# Patient Record
Sex: FEMALE | Race: ASIAN | URBAN - METROPOLITAN AREA
[De-identification: names, ages, dates, MRNs, and addresses within clinical notes are randomized per-mention and may not be internally consistent; named-entity substitution may affect disease eponyms.]

---

## 2018-06-28 ENCOUNTER — OP HISTORICAL/CONVERTED ENCOUNTER (OUTPATIENT)
Dept: OTHER | Age: 73
End: 2018-06-28

## 2023-03-22 ENCOUNTER — HOSPITAL ENCOUNTER (EMERGENCY)
Age: 78
Discharge: HOME | End: 2023-03-22
Attending: EMERGENCY MEDICINE
Payer: MEDICARE

## 2023-03-22 ENCOUNTER — APPOINTMENT (OUTPATIENT)
Dept: GENERAL RADIOLOGY | Age: 78
End: 2023-03-22
Attending: EMERGENCY MEDICINE
Payer: MEDICARE

## 2023-03-22 VITALS
DIASTOLIC BLOOD PRESSURE: 64 MMHG | HEIGHT: 60 IN | TEMPERATURE: 98 F | BODY MASS INDEX: 29.45 KG/M2 | RESPIRATION RATE: 18 BRPM | HEART RATE: 66 BPM | WEIGHT: 150 LBS | OXYGEN SATURATION: 100 % | SYSTOLIC BLOOD PRESSURE: 149 MMHG

## 2023-03-22 DIAGNOSIS — K29.90 GASTRITIS AND DUODENITIS: Primary | ICD-10-CM

## 2023-03-22 LAB
ALBUMIN SERPL-MCNC: 3.9 G/DL (ref 3.5–5)
ALBUMIN/GLOB SERPL: 1.3 (ref 1.1–2.2)
ALP SERPL-CCNC: 78 U/L (ref 45–117)
ALT SERPL-CCNC: 38 U/L (ref 12–78)
ANION GAP SERPL CALC-SCNC: 4 MMOL/L (ref 5–15)
AST SERPL W P-5'-P-CCNC: 20 U/L (ref 15–37)
BASOPHILS # BLD: 0 K/UL (ref 0–0.1)
BASOPHILS NFR BLD: 1 % (ref 0–1)
BILIRUB SERPL-MCNC: 0.4 MG/DL (ref 0.2–1)
BNP SERPL-MCNC: 109 PG/ML
BUN SERPL-MCNC: 15 MG/DL (ref 6–20)
BUN/CREAT SERPL: 13 (ref 12–20)
CA-I BLD-MCNC: 9 MG/DL (ref 8.5–10.1)
CHLORIDE SERPL-SCNC: 111 MMOL/L (ref 97–108)
CO2 SERPL-SCNC: 23 MMOL/L (ref 21–32)
CREAT SERPL-MCNC: 1.13 MG/DL (ref 0.55–1.02)
DIFFERENTIAL METHOD BLD: ABNORMAL
EOSINOPHIL # BLD: 0.1 K/UL (ref 0–0.4)
EOSINOPHIL NFR BLD: 1 % (ref 0–7)
ERYTHROCYTE [DISTWIDTH] IN BLOOD BY AUTOMATED COUNT: 12.4 % (ref 11.5–14.5)
GLOBULIN SER CALC-MCNC: 3.1 G/DL (ref 2–4)
GLUCOSE SERPL-MCNC: 169 MG/DL (ref 65–100)
HCT VFR BLD AUTO: 37.2 % (ref 35–47)
HGB BLD-MCNC: 11.9 G/DL (ref 11.5–16)
IMM GRANULOCYTES # BLD AUTO: 0.1 K/UL (ref 0–0.04)
IMM GRANULOCYTES NFR BLD AUTO: 1 % (ref 0–0.5)
LIPASE SERPL-CCNC: 197 U/L (ref 73–393)
LYMPHOCYTES # BLD: 1.3 K/UL (ref 0.8–3.5)
LYMPHOCYTES NFR BLD: 17 % (ref 12–49)
MCH RBC QN AUTO: 29.6 PG (ref 26–34)
MCHC RBC AUTO-ENTMCNC: 32 G/DL (ref 30–36.5)
MCV RBC AUTO: 92.5 FL (ref 80–99)
MONOCYTES # BLD: 0.3 K/UL (ref 0–1)
MONOCYTES NFR BLD: 4 % (ref 5–13)
NEUTS SEG # BLD: 5.6 K/UL (ref 1.8–8)
NEUTS SEG NFR BLD: 76 % (ref 32–75)
NRBC # BLD: 0 K/UL (ref 0–0.01)
NRBC BLD-RTO: 0 PER 100 WBC
PLATELET # BLD AUTO: 237 K/UL (ref 150–400)
PMV BLD AUTO: 9.2 FL (ref 8.9–12.9)
POTASSIUM SERPL-SCNC: 4.3 MMOL/L (ref 3.5–5.1)
PROT SERPL-MCNC: 7 G/DL (ref 6.4–8.2)
RBC # BLD AUTO: 4.02 M/UL (ref 3.8–5.2)
SODIUM SERPL-SCNC: 138 MMOL/L (ref 136–145)
TROPONIN I SERPL HS-MCNC: 5 NG/L (ref 0–51)
WBC # BLD AUTO: 7.3 K/UL (ref 3.6–11)

## 2023-03-22 PROCEDURE — 80053 COMPREHEN METABOLIC PANEL: CPT

## 2023-03-22 PROCEDURE — 93005 ELECTROCARDIOGRAM TRACING: CPT

## 2023-03-22 PROCEDURE — 84484 ASSAY OF TROPONIN QUANT: CPT

## 2023-03-22 PROCEDURE — 83690 ASSAY OF LIPASE: CPT

## 2023-03-22 PROCEDURE — 74011250636 HC RX REV CODE- 250/636: Performed by: EMERGENCY MEDICINE

## 2023-03-22 PROCEDURE — 96374 THER/PROPH/DIAG INJ IV PUSH: CPT

## 2023-03-22 PROCEDURE — 74011000250 HC RX REV CODE- 250: Performed by: EMERGENCY MEDICINE

## 2023-03-22 PROCEDURE — 85025 COMPLETE CBC W/AUTO DIFF WBC: CPT

## 2023-03-22 PROCEDURE — 99285 EMERGENCY DEPT VISIT HI MDM: CPT

## 2023-03-22 PROCEDURE — 36415 COLL VENOUS BLD VENIPUNCTURE: CPT

## 2023-03-22 PROCEDURE — 71045 X-RAY EXAM CHEST 1 VIEW: CPT

## 2023-03-22 PROCEDURE — 74011250637 HC RX REV CODE- 250/637: Performed by: EMERGENCY MEDICINE

## 2023-03-22 PROCEDURE — 83880 ASSAY OF NATRIURETIC PEPTIDE: CPT

## 2023-03-22 RX ORDER — LIDOCAINE HYDROCHLORIDE 20 MG/ML
15 SOLUTION OROPHARYNGEAL
Status: COMPLETED | OUTPATIENT
Start: 2023-03-22 | End: 2023-03-22

## 2023-03-22 RX ORDER — ONDANSETRON 4 MG/1
4 TABLET, ORALLY DISINTEGRATING ORAL
Qty: 20 TABLET | Refills: 0 | Status: SHIPPED | OUTPATIENT
Start: 2023-03-22

## 2023-03-22 RX ORDER — DICYCLOMINE HYDROCHLORIDE 10 MG/5ML
20 SOLUTION ORAL 4 TIMES DAILY
Qty: 473 ML | Refills: 0 | Status: SHIPPED | OUTPATIENT
Start: 2023-03-22

## 2023-03-22 RX ORDER — MAG HYDROX/ALUMINUM HYD/SIMETH 200-200-20
30 SUSPENSION, ORAL (FINAL DOSE FORM) ORAL ONCE
Status: COMPLETED | OUTPATIENT
Start: 2023-03-22 | End: 2023-03-22

## 2023-03-22 RX ORDER — ACETAMINOPHEN 325 MG/1
650 TABLET ORAL
Status: COMPLETED | OUTPATIENT
Start: 2023-03-22 | End: 2023-03-22

## 2023-03-22 RX ORDER — ONDANSETRON 2 MG/ML
4 INJECTION INTRAMUSCULAR; INTRAVENOUS
Status: COMPLETED | OUTPATIENT
Start: 2023-03-22 | End: 2023-03-22

## 2023-03-22 RX ORDER — FAMOTIDINE 20 MG/1
20 TABLET, FILM COATED ORAL 2 TIMES DAILY
Qty: 20 TABLET | Refills: 0 | Status: SHIPPED | OUTPATIENT
Start: 2023-03-22 | End: 2023-04-01

## 2023-03-22 RX ADMIN — LIDOCAINE HYDROCHLORIDE 15 ML: 20 SOLUTION ORAL; TOPICAL at 13:43

## 2023-03-22 RX ADMIN — ALUMINUM HYDROXIDE, MAGNESIUM HYDROXIDE, AND SIMETHICONE 30 ML: 200; 200; 20 SUSPENSION ORAL at 13:40

## 2023-03-22 RX ADMIN — ONDANSETRON 4 MG: 2 INJECTION INTRAMUSCULAR; INTRAVENOUS at 12:29

## 2023-03-22 RX ADMIN — ACETAMINOPHEN 650 MG: 325 TABLET ORAL at 14:12

## 2023-03-22 NOTE — ED NOTES
Pt presented to the ER with complaint of Epigastric pain. Pt stated she started having epigastric pain and headache this morning. Pt complains of N/V with onset. oriented to room and call bell,, cardiac monitoring initiated , spO2 Monitoring initiated , IV  initiated , call bell within reach, side rails up x2, resting comfortable but easily arousable, no signs of acute distress. , bed in lowest position, will continue to monitor      Airway: Patent, Managing oral secretions, and No obstruction    Respiratory: Normal Rate , Normal Depth, No acute Distress, and Speaking in full sentences    Cardiac: WNL    Neuro: AVPU alert and A&O4/4    GI: Abdominal pain to Epigastric pain, Soft, and Tender    : WNL    Muscle/Skeletal: WNL

## 2023-03-22 NOTE — ED PROVIDER NOTES
EMERGENCY DEPARTMENT HISTORY AND PHYSICAL EXAM      Date: 3/22/2023  Patient Name: Faisal Jacobs    History of Presenting Illness     Chief Complaint   Patient presents with    Abdominal Pain       History Provided By: Patient    HPI: Faisal Jacobs, 70 y.o. female with a past medical history significant No significant past medical history presents to the ED with chief complaint of Abdominal Pain  . 12year-old female presents with epigastric abdominal pain. Symptoms have been present for a few days. No nausea or vomiting. No black or bloody stool. No diarrhea or constipation. No radiation of the discomfort. No chest pain. There are no other complaints, changes, or physical findings at this time. PCP: Renu Torres MD    Current Outpatient Medications   Medication Sig Dispense Refill    ondansetron (ZOFRAN ODT) 4 mg disintegrating tablet Take 1 Tablet by mouth every eight (8) hours as needed for Nausea or Vomiting. 20 Tablet 0    famotidine (Pepcid) 20 mg tablet Take 1 Tablet by mouth two (2) times a day for 10 days. 20 Tablet 0    dicyclomine (BENTYL) 10 mg/5 mL soln oral solution Take 10 mL by mouth four (4) times daily. 473 mL 0       Past History     Past Medical History:  No past medical history on file. Past Surgical History:  No past surgical history on file. Family History:  No family history on file. Social History: Allergies: Allergies   Allergen Reactions    Pcn [Penicillins] Unknown (comments)         Review of Systems   Review of Systems   Constitutional: Negative. Negative for chills, fatigue and fever. HENT: Negative. Negative for congestion, nosebleeds and sore throat. Eyes: Negative. Negative for pain, discharge and visual disturbance. Respiratory: Negative. Negative for cough, chest tightness and shortness of breath. Cardiovascular:  Negative for chest pain, palpitations and leg swelling.    Gastrointestinal:  Positive for abdominal pain. Negative for blood in stool, constipation, diarrhea, nausea and vomiting. Endocrine: Negative. Genitourinary: Negative. Negative for difficulty urinating, dysuria, pelvic pain and vaginal bleeding. Musculoskeletal: Negative. Negative for arthralgias, back pain and myalgias. Skin: Negative. Negative for rash and wound. Allergic/Immunologic: Negative. Neurological: Negative. Negative for dizziness, syncope, weakness, numbness and headaches. Hematological: Negative. Psychiatric/Behavioral: Negative. Negative for agitation, confusion and suicidal ideas. All other systems reviewed and are negative. Positives and Pertinent negatives as per HPI. Physical Exam   Patient Vitals for the past 24 hrs:   Temp Pulse Resp BP SpO2   03/22/23 1205 98 °F (36.7 °C) 66 18 (!) 149/64 100 %         Physical Exam  Vitals and nursing note reviewed. Exam conducted with a chaperone present. Constitutional:       Appearance: Normal appearance. She is normal weight. HENT:      Head: Normocephalic and atraumatic. Nose: Nose normal.      Mouth/Throat:      Mouth: Mucous membranes are moist.      Pharynx: Oropharynx is clear. Eyes:      Extraocular Movements: Extraocular movements intact. Conjunctiva/sclera: Conjunctivae normal.      Pupils: Pupils are equal, round, and reactive to light. Cardiovascular:      Rate and Rhythm: Normal rate and regular rhythm. Pulses: Normal pulses. Heart sounds: Normal heart sounds. Pulmonary:      Effort: Pulmonary effort is normal. No respiratory distress. Breath sounds: Normal breath sounds. Abdominal:      General: Abdomen is flat. Bowel sounds are normal. There is no distension. Palpations: Abdomen is soft. Tenderness: There is abdominal tenderness in the epigastric area. There is no guarding. Musculoskeletal:         General: No swelling, tenderness, deformity or signs of injury. Normal range of motion.       Cervical back: Normal range of motion and neck supple. Right lower leg: No edema. Left lower leg: No edema. Skin:     General: Skin is warm and dry. Capillary Refill: Capillary refill takes less than 2 seconds. Findings: No lesion or rash. Neurological:      General: No focal deficit present. Mental Status: She is alert and oriented to person, place, and time. Mental status is at baseline. Cranial Nerves: No cranial nerve deficit. Psychiatric:         Mood and Affect: Mood normal.         Behavior: Behavior normal.         Thought Content: Thought content normal.         Judgment: Judgment normal.       Diagnostic Study Results     LABS:   Recent Results (from the past 12 hour(s))   METABOLIC PANEL, COMPREHENSIVE    Collection Time: 03/22/23 12:20 PM   Result Value Ref Range    Sodium 138 136 - 145 mmol/L    Potassium 4.3 3.5 - 5.1 mmol/L    Chloride 111 (H) 97 - 108 mmol/L    CO2 23 21 - 32 mmol/L    Anion gap 4 (L) 5 - 15 mmol/L    Glucose 169 (H) 65 - 100 mg/dL    BUN 15 6 - 20 mg/dL    Creatinine 1.13 (H) 0.55 - 1.02 mg/dL    BUN/Creatinine ratio 13 12 - 20      eGFR 50 (L) >60 ml/min/1.73m2    Calcium 9.0 8.5 - 10.1 mg/dL    Bilirubin, total 0.4 0.2 - 1.0 mg/dL    AST (SGOT) 20 15 - 37 U/L    ALT (SGPT) 38 12 - 78 U/L    Alk.  phosphatase 78 45 - 117 U/L    Protein, total 7.0 6.4 - 8.2 g/dL    Albumin 3.9 3.5 - 5.0 g/dL    Globulin 3.1 2.0 - 4.0 g/dL    A-G Ratio 1.3 1.1 - 2.2     CBC WITH AUTOMATED DIFF    Collection Time: 03/22/23 12:20 PM   Result Value Ref Range    WBC 7.3 3.6 - 11.0 K/uL    RBC 4.02 3.80 - 5.20 M/uL    HGB 11.9 11.5 - 16.0 g/dL    HCT 37.2 35.0 - 47.0 %    MCV 92.5 80.0 - 99.0 FL    MCH 29.6 26.0 - 34.0 PG    MCHC 32.0 30.0 - 36.5 g/dL    RDW 12.4 11.5 - 14.5 %    PLATELET 811 085 - 426 K/uL    MPV 9.2 8.9 - 12.9 FL    NRBC 0.0 0.0  WBC    ABSOLUTE NRBC 0.00 0.00 - 0.01 K/uL    NEUTROPHILS 76 (H) 32 - 75 %    LYMPHOCYTES 17 12 - 49 %    MONOCYTES 4 (L) 5 - 13 %    EOSINOPHILS 1 0 - 7 %    BASOPHILS 1 0 - 1 %    IMMATURE GRANULOCYTES 1 (H) 0 - 0.5 %    ABS. NEUTROPHILS 5.6 1.8 - 8.0 K/UL    ABS. LYMPHOCYTES 1.3 0.8 - 3.5 K/UL    ABS. MONOCYTES 0.3 0.0 - 1.0 K/UL    ABS. EOSINOPHILS 0.1 0.0 - 0.4 K/UL    ABS. BASOPHILS 0.0 0.0 - 0.1 K/UL    ABS. IMM. GRANS. 0.1 (H) 0.00 - 0.04 K/UL    DF AUTOMATED     TROPONIN-HIGH SENSITIVITY    Collection Time: 03/22/23 12:20 PM   Result Value Ref Range    Troponin-High Sensitivity 5 0 - 51 ng/L   NT-PRO BNP    Collection Time: 03/22/23 12:20 PM   Result Value Ref Range    NT pro- <450 pg/mL   LIPASE    Collection Time: 03/22/23 12:20 PM   Result Value Ref Range    Lipase 197 73 - 393 U/L        EKG: EKG interpreted independently by ER physician. RADIOLOGY:  Non-plain film images such as CT, Ultrasound and MRI are read by the radiologist. Plain radiographic images are visualized and preliminarily interpreted by the ED Provider with the below findings:     Chest x-ray reviewed independently by ER physician. No evidence of pneumonia, pleural effusion, rib fracture or pneumothorax. No widened mediastinum. Negative acute. I do agree with radiology interpretation. Interpretation per the Radiologist below, if available at the time of this note:     XR CHEST PORT    Result Date: 3/22/2023  EXAM: XR CHEST PORT DATE: 3/22/2023 12:35 PM INDICATION: Epigastric pain COMPARISON: None. FINDINGS: AP portable chest radiograph. The heart size is normal. There are atheromatous calcifications of the aortic arch. No consolidation is seen. The vascular clarity is normal. There is no evidence of effusion or pneumothorax. No free air is seen beneath the hemidiaphragms     No acute cardiopulmonary findings. CT Results  (Last 48 hours)      None          CXR Results  (Last 48 hours)                 03/22/23 1235  XR CHEST PORT Final result    Impression:  No acute cardiopulmonary findings.                Narrative:  EXAM: XR CHEST PORT       DATE: 3/22/2023 12:35 PM       INDICATION: Epigastric pain       COMPARISON: None. FINDINGS: AP portable chest radiograph. The heart size is normal. There are   atheromatous calcifications of the aortic arch. No consolidation is seen. The   vascular clarity is normal. There is no evidence of effusion or pneumothorax. No   free air is seen beneath the hemidiaphragms                   Medical Decision Making and ED Course       CC/HPI Summary, DDx, ED Course, and Reassessment: 77-year-old female with epigastric pain without radiation differential includes pancreatitis versus gallstones versus upper GI bleed versus GERD gastritis. Also rule out ACS. Plan for lab work including CBC CMP lipase troponin x-ray EKG and reassessment. Trial of pain control with a GI cocktail.     These orders were placed during the ER evaluation:  Orders Placed This Encounter    XR CHEST PORT     Standing Status:   Standing     Number of Occurrences:   1     Order Specific Question:   Reason for Exam     Answer:   Epigastric pain    METABOLIC PANEL, COMPREHENSIVE     Standing Status:   Standing     Number of Occurrences:   1    CBC WITH AUTOMATED DIFF     Standing Status:   Standing     Number of Occurrences:   1    TROPONIN-HIGH SENSITIVITY     Standing Status:   Standing     Number of Occurrences:   1    BNP (NT-PRO)     Standing Status:   Standing     Number of Occurrences:   1    LIPASE     Standing Status:   Standing     Number of Occurrences:   1    URINALYSIS W/ RFLX MICROSCOPIC     Standing Status:   Standing     Number of Occurrences:   1    EKG 12 LEAD INITIAL     Standing Status:   Standing     Number of Occurrences:   1     Order Specific Question:   Reason for Exam:     Answer:   Epigastric pain    ondansetron (ZOFRAN) injection 4 mg    lidocaine (XYLOCAINE) 2 % viscous solution 15 mL    alum-mag hydroxide-simeth (MYLANTA) oral suspension 30 mL    ondansetron (ZOFRAN ODT) 4 mg disintegrating tablet     Sig: Take 1 Tablet by mouth every eight (8) hours as needed for Nausea or Vomiting. Dispense:  20 Tablet     Refill:  0    famotidine (Pepcid) 20 mg tablet     Sig: Take 1 Tablet by mouth two (2) times a day for 10 days. Dispense:  20 Tablet     Refill:  0    dicyclomine (BENTYL) 10 mg/5 mL soln oral solution     Sig: Take 10 mL by mouth four (4) times daily. Dispense:  473 mL     Refill:  0        Patient was given the following medications:  Medications   ondansetron (ZOFRAN) injection 4 mg (4 mg IntraVENous Given 3/22/23 1229)   lidocaine (XYLOCAINE) 2 % viscous solution 15 mL (15 mL Mouth/Throat Given 3/22/23 1343)   alum-mag hydroxide-simeth (MYLANTA) oral suspension 30 mL (30 mL Oral Given 3/22/23 1340)           ED Course:       ED Course as of 03/22/23 1411   Wed Mar 22, 2023   1253 EKG at 1221 normal sinus rhythm rate of 68. No ST changes. T wave inversions. Normal intervals. Is not ACS. Interpreted by ER physician. [HP]   5709 METABOLIC PANEL, COMPREHENSIVE(!):    Sodium 138   Potassium 4.3   Chloride 111(!)   CO2 23   Anion gap 4(!)   Glucose 169(!)   BUN 15   Creatinine 1.13(!)   BUN/Creatinine ratio 13   eGFR 50(!)   Calcium 9.0   Bilirubin, total 0.4   AST 20   ALT 38   Alk. phosphatase 78   Protein, total 7.0   Albumin 3.9   Globulin 3.1   A-G Ratio 1.3  Interpreted independently by ER physician as normal   [HP]   1308 CBC WITH AUTOMATED DIFF(!):    WBC 7.3   RBC 4.02   HGB 11.9   HCT 37.2   MCV 92.5   MCH 29.6   MCHC 32.0   RDW 12.4   PLATELET 113   MPV 9.2   NRBC 0.0   ABSOLUTE NRBC 0.00   NEUTROPHILS 76(!)   LYMPHOCYTES 17   MONOCYTES 4(!)   EOSINOPHILS 1   BASOPHILS 1   IMMATURE GRANULOCYTES 1(!)   ABS. NEUTROPHILS 5.6   ABS. LYMPHOCYTES 1.3   ABS. MONOCYTES 0.3   ABS. EOSINOPHILS 0.1   ABS. BASOPHILS 0.0   ABS. IMM.  GRANS. 0.1(!)   DF AUTOMATED  Interpreted independently by ER physician as normal   [HP]   3136 LIPASE:    Lipase 197  Interpreted independently by ER physician as normal   [HP]   1308 BNP (NT-PRO):    NT pro-  Interpreted independently by ER physician as normal   [HP]   1308 TROPONIN-HIGH SENSITIVITY:    Troponin-High Sensitivity 5  Interpreted independently by ER physician as normal   [HP]   4757 Patient is improved after a GI cocktail. [HP]      ED Course User Index  [HP] Felecia LA MD         Vital Signs-Reviewed the patient's vital signs. Patient Vitals for the past 24 hrs:   Temp Pulse Resp BP SpO2   03/22/23 1205 98 °F (36.7 °C) 66 18 (!) 149/64 100 %     Vitals interpreted independently by ER physician. stable    Medical decision making tools:       HEART SCORE:     History (slight suspicious 0, moderate +1, highly suspicious +2)  0  EKG (normal 0, nonspecific repol changes +1, ST changes +2)  0  Age (<45 y 0, 45-64y +1, >65y +2)  2  Risk Factors HTN, XOL, DM, obesity, smoker, CAD, PAD (none 0, 1-2 factors +1, >3 +2 factors) 0  Troponin (normal 0, 1-3x limit +1, >3x limit +2)  0    Heart Score 2        Management   Scores 0-3: 0.9-1.7% risk of adverse cardiac event. In the HEART Score study, these patients were discharged (0.99% in the retrospective study, 1.7% in the prospective study)   Scores 4-6: 12-16.6% risk of adverse cardiac event. In the HEART Score study, these patients were admitted to the hospital. (11.6% retrospective, 16.6% prospective)   Scores ? 7: 50-65% risk of adverse cardiac event. In the HEART Score study, these patients were candidates for early invasive measures. (65.2% retrospective, 50.1% prospective)   A MACE (Major Adverse Cardiac Event) was defined as all-cause mortality, myocardial infarction, or coronary revascularization. Original/Primary Reference  Slick COLEMAN, Amalia Escobar. Chest pain in the emergency room: value of the HEART score. Net Heart J. 2008;16(6):191-6. Validation  Angelica BLAIR, Slick COLEMAN, Amalia DOWNS, et al. A prospective validation of the HEART score for chest pain patients at the emergency department.  Int Rosette Deiters Cardiol. 2013;168(3):2153-8. Angelica BE, Slick AJ, Linus Kanr, et al. Chest pain in the emergency room: a multicenter validation of the HEART Score. Crit Pathw Cardiol. 2010;9(3):164-9. Haley KIANNA, Jareth RF, Lottie NANCE, et al. The HEART Pathway Randomized Controlled Trial One Year Outcomes. [facility] 2018; No data recorded          Disposition Considerations (Tests not done, Shared Decision Making, Pt Expectation of Test or Tx.): 72-year-old female low risk according to heart score with reproducible epigastric tenderness. Lab work is unremarkable including lipase LFTs leukocytosis not present. X-ray is also clear no evidence of perforated viscus. Patient is improved with a GI cocktail. Will treat for gastritis with a PPI Zofran and Bentyl for discharge. Also referral for GI. No evidence of GI bleed presently. CONSULTS: (Who and What was discussed)  None    Chronic Conditions: no    Social Determinants affecting Dx or Tx: None    Records Reviewed (source and summary of external notes): None  Records Reviewed: Previous Hospital chart. EMS run report. I reviewed the vital signs, available nursing notes, past medical history, past surgical history, family history and social history. Initial assessment performed. The patients presenting problems have been discussed, and they are in agreement with the care plan formulated and outlined with them. I have encouraged them to ask questions as they arise throughout their visit. Discharged      Procedures               Disposition       Emergency Department Disposition:  Discharged      Diagnosis     Clinical Impression:   1. Gastritis and duodenitis        Attestations:    Andrez Alvarez MD    Please note that this dictation was completed with [employer], the computer voice recognition software. Quite often unanticipated grammatical, syntax, homophones, and other interpretive errors are inadvertently transcribed by the computer software. Please disregard these errors. Please excuse any errors that have escaped final proofreading. Thank you.

## 2023-03-22 NOTE — Clinical Note
[facility] EMERGENCY DEPT  [address] [place] [id number]  [phone]    Work/School Note    Date: 3/22/2023    To Whom It May concern:      Kevyn Evans was seen and treated today in the emergency room by the following provider(s):  Attending Provider: Kayla Galvez MD.      Kevyn Evans is excused from work/school on 03/22/23. She is clear to return to work/school on 03/23/23.         Sincerely,          Prateek Valencia MD

## 2023-03-22 NOTE — DISCHARGE INSTRUCTIONS
Thank you! Thank you for allowing me to care for you in the emergency department. It is my goal to provide you with excellent care. If you have not received excellent quality care, please ask to speak to the nurse manager. Please fill out the survey that will come to you by mail or email since we listen to your feedback! Below you will find a list of your tests from today's visit. Should you have any questions, please do not hesitate to call the emergency department. Labs  Recent Results (from the past 12 hour(s))   METABOLIC PANEL, COMPREHENSIVE    Collection Time: 03/22/23 12:20 PM   Result Value Ref Range    Sodium 138 136 - 145 mmol/L    Potassium 4.3 3.5 - 5.1 mmol/L    Chloride 111 (H) 97 - 108 mmol/L    CO2 23 21 - 32 mmol/L    Anion gap 4 (L) 5 - 15 mmol/L    Glucose 169 (H) 65 - 100 mg/dL    BUN 15 6 - 20 mg/dL    Creatinine 1.13 (H) 0.55 - 1.02 mg/dL    BUN/Creatinine ratio 13 12 - 20      eGFR 50 (L) >60 ml/min/1.73m2    Calcium 9.0 8.5 - 10.1 mg/dL    Bilirubin, total 0.4 0.2 - 1.0 mg/dL    AST (SGOT) 20 15 - 37 U/L    ALT (SGPT) 38 12 - 78 U/L    Alk. phosphatase 78 45 - 117 U/L    Protein, total 7.0 6.4 - 8.2 g/dL    Albumin 3.9 3.5 - 5.0 g/dL    Globulin 3.1 2.0 - 4.0 g/dL    A-G Ratio 1.3 1.1 - 2.2     CBC WITH AUTOMATED DIFF    Collection Time: 03/22/23 12:20 PM   Result Value Ref Range    WBC 7.3 3.6 - 11.0 K/uL    RBC 4.02 3.80 - 5.20 M/uL    HGB 11.9 11.5 - 16.0 g/dL    HCT 37.2 35.0 - 47.0 %    MCV 92.5 80.0 - 99.0 FL    MCH 29.6 26.0 - 34.0 PG    MCHC 32.0 30.0 - 36.5 g/dL    RDW 12.4 11.5 - 14.5 %    PLATELET 208 099 - 294 K/uL    MPV 9.2 8.9 - 12.9 FL    NRBC 0.0 0.0  WBC    ABSOLUTE NRBC 0.00 0.00 - 0.01 K/uL    NEUTROPHILS 76 (H) 32 - 75 %    LYMPHOCYTES 17 12 - 49 %    MONOCYTES 4 (L) 5 - 13 %    EOSINOPHILS 1 0 - 7 %    BASOPHILS 1 0 - 1 %    IMMATURE GRANULOCYTES 1 (H) 0 - 0.5 %    ABS. NEUTROPHILS 5.6 1.8 - 8.0 K/UL    ABS. LYMPHOCYTES 1.3 0.8 - 3.5 K/UL    ABS. MONOCYTES 0.3 0.0 - 1.0 K/UL    ABS. EOSINOPHILS 0.1 0.0 - 0.4 K/UL    ABS. BASOPHILS 0.0 0.0 - 0.1 K/UL    ABS. IMM. GRANS. 0.1 (H) 0.00 - 0.04 K/UL    DF AUTOMATED     TROPONIN-HIGH SENSITIVITY    Collection Time: 03/22/23 12:20 PM   Result Value Ref Range    Troponin-High Sensitivity 5 0 - 51 ng/L   NT-PRO BNP    Collection Time: 03/22/23 12:20 PM   Result Value Ref Range    NT pro- <450 pg/mL   LIPASE    Collection Time: 03/22/23 12:20 PM   Result Value Ref Range    Lipase 197 73 - 393 U/L       Radiologic Studies  XR CHEST PORT   Final Result   No acute cardiopulmonary findings. CT Results  (Last 48 hours)      None          CXR Results  (Last 48 hours)                 03/22/23 1235  XR CHEST PORT Final result    Impression:  No acute cardiopulmonary findings. Narrative:  EXAM: XR CHEST PORT       DATE: 3/22/2023 12:35 PM       INDICATION: Epigastric pain       COMPARISON: None. FINDINGS: AP portable chest radiograph. The heart size is normal. There are   atheromatous calcifications of the aortic arch. No consolidation is seen. The   vascular clarity is normal. There is no evidence of effusion or pneumothorax. No   free air is seen beneath the hemidiaphragms                 ------------------------------------------------------------------------------------------------------------  The exam and treatment you received in the Emergency Department were for an urgent problem and are not intended as complete care. It is important that you follow-up with a doctor, nurse practitioner, or physician assistant to:  (1) confirm your diagnosis,  (2) re-evaluation of changes in your illness and treatment, and  (3) for ongoing care. Please take your discharge instructions with you when you go to your follow-up appointment. If you have any problem arranging a follow-up appointment, contact the Emergency Department.   If your symptoms become worse or you do not improve as expected and you are unable to reach your health care provider, please return to the Emergency Department. We are available 24 hours a day. If a prescription has been provided, please have it filled as soon as possible to prevent a delay in treatment. If you have any questions or reservations about taking the medication due to side effects or interactions with other medications, please call your primary care provider or contact the ER.

## 2023-03-23 LAB
ATRIAL RATE: 68 BPM
CALCULATED P AXIS, ECG09: 25 DEGREES
CALCULATED R AXIS, ECG10: 3 DEGREES
CALCULATED T AXIS, ECG11: 59 DEGREES
DIAGNOSIS, 93000: NORMAL
P-R INTERVAL, ECG05: 168 MS
Q-T INTERVAL, ECG07: 418 MS
QRS DURATION, ECG06: 74 MS
QTC CALCULATION (BEZET), ECG08: 444 MS
VENTRICULAR RATE, ECG03: 68 BPM